# Patient Record
Sex: FEMALE | Race: WHITE | NOT HISPANIC OR LATINO | Employment: OTHER | URBAN - METROPOLITAN AREA
[De-identification: names, ages, dates, MRNs, and addresses within clinical notes are randomized per-mention and may not be internally consistent; named-entity substitution may affect disease eponyms.]

---

## 2017-11-02 ENCOUNTER — TELEPHONE (OUTPATIENT)
Dept: INTERNAL MEDICINE | Age: 37
End: 2017-11-02

## 2017-11-02 NOTE — TELEPHONE ENCOUNTER
Pt want's to know when she has a TDAP injection last.   She was walking barefoot last evening on her carpet and was stuck in the foot with a carpet tack. Foot is swollen some with bruising. No redness.

## 2017-11-29 ENCOUNTER — APPOINTMENT (OUTPATIENT)
Dept: CT IMAGING | Facility: HOSPITAL | Age: 37
End: 2017-11-29
Attending: EMERGENCY MEDICINE

## 2017-11-29 ENCOUNTER — HOSPITAL ENCOUNTER (EMERGENCY)
Facility: HOSPITAL | Age: 37
Discharge: HOME | End: 2017-11-29
Attending: EMERGENCY MEDICINE | Admitting: EMERGENCY MEDICINE

## 2017-11-29 VITALS
TEMPERATURE: 97.7 F | HEIGHT: 62 IN | RESPIRATION RATE: 18 BRPM | HEART RATE: 55 BPM | WEIGHT: 201.38 LBS | DIASTOLIC BLOOD PRESSURE: 91 MMHG | OXYGEN SATURATION: 97 % | SYSTOLIC BLOOD PRESSURE: 140 MMHG | BODY MASS INDEX: 37.06 KG/M2

## 2017-11-29 DIAGNOSIS — E27.8 ADRENAL MASS (HCC): ICD-10-CM

## 2017-11-29 DIAGNOSIS — K52.9 GASTROENTERITIS: ICD-10-CM

## 2017-11-29 DIAGNOSIS — K76.89 HEPATIC CYST: ICD-10-CM

## 2017-11-29 DIAGNOSIS — R10.31 RIGHT LOWER QUADRANT ABDOMINAL PAIN: Primary | ICD-10-CM

## 2017-11-29 LAB
ALBUMIN SERPL-MCNC: 4 G/DL (ref 3.5–5.2)
ALBUMIN/GLOB SERPL: 1.3 G/DL
ALP SERPL-CCNC: 51 U/L (ref 40–129)
ALT SERPL W P-5'-P-CCNC: 21 U/L (ref 5–33)
ANION GAP SERPL CALCULATED.3IONS-SCNC: 9.9 MMOL/L
AST SERPL-CCNC: 22 U/L (ref 5–32)
B-HCG UR QL: NEGATIVE
BASOPHILS # BLD AUTO: 0.04 10*3/MM3 (ref 0–0.2)
BASOPHILS NFR BLD AUTO: 0.7 % (ref 0–2)
BILIRUB SERPL-MCNC: 0.5 MG/DL (ref 0.2–1.2)
BILIRUB UR QL STRIP: NEGATIVE
BUN BLD-MCNC: 10 MG/DL (ref 6–20)
BUN/CREAT SERPL: 11.9 (ref 7–25)
CALCIUM SPEC-SCNC: 8.7 MG/DL (ref 8.6–10.5)
CHLORIDE SERPL-SCNC: 102 MMOL/L (ref 98–107)
CLARITY UR: CLEAR
CO2 SERPL-SCNC: 25.1 MMOL/L (ref 22–29)
COLOR UR: YELLOW
CREAT BLD-MCNC: 0.84 MG/DL (ref 0.57–1)
DEPRECATED RDW RBC AUTO: 42.4 FL (ref 37–54)
EOSINOPHIL # BLD AUTO: 0.06 10*3/MM3 (ref 0.1–0.3)
EOSINOPHIL NFR BLD AUTO: 1 % (ref 0–4)
ERYTHROCYTE [DISTWIDTH] IN BLOOD BY AUTOMATED COUNT: 13 % (ref 11.5–14.5)
GFR SERPL CREATININE-BSD FRML MDRD: 75 ML/MIN/1.73
GLOBULIN UR ELPH-MCNC: 3 GM/DL
GLUCOSE BLD-MCNC: 103 MG/DL (ref 65–99)
GLUCOSE UR STRIP-MCNC: NEGATIVE MG/DL
HCT VFR BLD AUTO: 39.3 % (ref 37–47)
HGB BLD-MCNC: 13.4 G/DL (ref 12–16)
HGB UR QL STRIP.AUTO: NEGATIVE
IMM GRANULOCYTES # BLD: 0.01 10*3/MM3 (ref 0–0.03)
IMM GRANULOCYTES NFR BLD: 0.2 % (ref 0–0.5)
KETONES UR QL STRIP: NEGATIVE
LEUKOCYTE ESTERASE UR QL STRIP.AUTO: NEGATIVE
LIPASE SERPL-CCNC: 41 U/L (ref 13–60)
LYMPHOCYTES # BLD AUTO: 1.62 10*3/MM3 (ref 0.6–4.8)
LYMPHOCYTES NFR BLD AUTO: 27.6 % (ref 20–45)
MCH RBC QN AUTO: 30.2 PG (ref 27–31)
MCHC RBC AUTO-ENTMCNC: 34.1 G/DL (ref 31–37)
MCV RBC AUTO: 88.7 FL (ref 81–99)
MONOCYTES # BLD AUTO: 0.39 10*3/MM3 (ref 0–1)
MONOCYTES NFR BLD AUTO: 6.7 % (ref 3–8)
NEUTROPHILS # BLD AUTO: 3.74 10*3/MM3 (ref 1.5–8.3)
NEUTROPHILS NFR BLD AUTO: 63.8 % (ref 45–70)
NITRITE UR QL STRIP: NEGATIVE
NRBC BLD MANUAL-RTO: 0 /100 WBC (ref 0–0)
PH UR STRIP.AUTO: 5.5 [PH] (ref 4.5–8)
PLATELET # BLD AUTO: 294 10*3/MM3 (ref 140–500)
PMV BLD AUTO: 10.4 FL (ref 7.4–10.4)
POTASSIUM BLD-SCNC: 4.6 MMOL/L (ref 3.5–5.2)
PROT SERPL-MCNC: 7 G/DL (ref 6–8.5)
PROT UR QL STRIP: NEGATIVE
RBC # BLD AUTO: 4.43 10*6/MM3 (ref 4.2–5.4)
SODIUM BLD-SCNC: 137 MMOL/L (ref 136–145)
SP GR UR STRIP: 1.01 (ref 1–1.03)
UROBILINOGEN UR QL STRIP: NORMAL
WBC NRBC COR # BLD: 5.86 10*3/MM3 (ref 4.8–10.8)

## 2017-11-29 PROCEDURE — 99283 EMERGENCY DEPT VISIT LOW MDM: CPT

## 2017-11-29 PROCEDURE — 0 IOPAMIDOL PER 1 ML: Performed by: EMERGENCY MEDICINE

## 2017-11-29 PROCEDURE — 85025 COMPLETE CBC W/AUTO DIFF WBC: CPT | Performed by: EMERGENCY MEDICINE

## 2017-11-29 PROCEDURE — 81003 URINALYSIS AUTO W/O SCOPE: CPT | Performed by: EMERGENCY MEDICINE

## 2017-11-29 PROCEDURE — 83690 ASSAY OF LIPASE: CPT | Performed by: EMERGENCY MEDICINE

## 2017-11-29 PROCEDURE — 96374 THER/PROPH/DIAG INJ IV PUSH: CPT

## 2017-11-29 PROCEDURE — 99284 EMERGENCY DEPT VISIT MOD MDM: CPT | Performed by: EMERGENCY MEDICINE

## 2017-11-29 PROCEDURE — 96361 HYDRATE IV INFUSION ADD-ON: CPT

## 2017-11-29 PROCEDURE — 81025 URINE PREGNANCY TEST: CPT | Performed by: EMERGENCY MEDICINE

## 2017-11-29 PROCEDURE — 25010000002 ONDANSETRON PER 1 MG: Performed by: EMERGENCY MEDICINE

## 2017-11-29 PROCEDURE — 80053 COMPREHEN METABOLIC PANEL: CPT | Performed by: EMERGENCY MEDICINE

## 2017-11-29 PROCEDURE — 74177 CT ABD & PELVIS W/CONTRAST: CPT

## 2017-11-29 RX ORDER — ONDANSETRON 2 MG/ML
4 INJECTION INTRAMUSCULAR; INTRAVENOUS ONCE
Status: COMPLETED | OUTPATIENT
Start: 2017-11-29 | End: 2017-11-29

## 2017-11-29 RX ORDER — ONDANSETRON HYDROCHLORIDE 8 MG/1
8 TABLET, FILM COATED ORAL EVERY 8 HOURS PRN
Qty: 10 TABLET | Refills: 0 | Status: SHIPPED | OUTPATIENT
Start: 2017-11-29 | End: 2018-08-08

## 2017-11-29 RX ORDER — SODIUM CHLORIDE 0.9 % (FLUSH) 0.9 %
10 SYRINGE (ML) INJECTION AS NEEDED
Status: DISCONTINUED | OUTPATIENT
Start: 2017-11-29 | End: 2017-11-29 | Stop reason: HOSPADM

## 2017-11-29 RX ADMIN — ONDANSETRON 4 MG: 2 INJECTION, SOLUTION INTRAMUSCULAR; INTRAVENOUS at 07:40

## 2017-11-29 RX ADMIN — SODIUM CHLORIDE 1000 ML: 9 INJECTION, SOLUTION INTRAVENOUS at 07:39

## 2017-11-29 RX ADMIN — IOPAMIDOL 100 ML: 755 INJECTION, SOLUTION INTRAVENOUS at 08:02

## 2018-07-31 DIAGNOSIS — Z00.00 PREVENTATIVE HEALTH CARE: Primary | ICD-10-CM

## 2018-08-01 LAB
ALBUMIN SERPL-MCNC: 4.6 G/DL (ref 3.5–5.2)
ALBUMIN/GLOB SERPL: 1.8 G/DL
ALP SERPL-CCNC: 42 U/L (ref 39–117)
ALT SERPL-CCNC: 18 U/L (ref 1–33)
APPEARANCE UR: CLEAR
AST SERPL-CCNC: 18 U/L (ref 1–32)
BASOPHILS # BLD AUTO: 0.02 10*3/MM3 (ref 0–0.2)
BASOPHILS NFR BLD AUTO: 0.3 % (ref 0–1.5)
BILIRUB SERPL-MCNC: 0.5 MG/DL (ref 0.1–1.2)
BILIRUB UR QL STRIP: NEGATIVE
BUN SERPL-MCNC: 17 MG/DL (ref 6–20)
BUN/CREAT SERPL: 17.2 (ref 7–25)
CALCIUM SERPL-MCNC: 9.3 MG/DL (ref 8.6–10.5)
CHLORIDE SERPL-SCNC: 100 MMOL/L (ref 98–107)
CHOLEST SERPL-MCNC: 179 MG/DL (ref 0–200)
CHOLEST/HDLC SERPL: 2.67 {RATIO}
CO2 SERPL-SCNC: 25.6 MMOL/L (ref 22–29)
COLOR UR: YELLOW
CREAT SERPL-MCNC: 0.99 MG/DL (ref 0.57–1)
EOSINOPHIL # BLD AUTO: 0.03 10*3/MM3 (ref 0–0.7)
EOSINOPHIL NFR BLD AUTO: 0.5 % (ref 0.3–6.2)
ERYTHROCYTE [DISTWIDTH] IN BLOOD BY AUTOMATED COUNT: 13.3 % (ref 11.7–13)
GLOBULIN SER CALC-MCNC: 2.5 GM/DL
GLUCOSE SERPL-MCNC: 95 MG/DL (ref 65–99)
GLUCOSE UR QL: NEGATIVE
HBA1C MFR BLD: 5.19 % (ref 4.8–5.6)
HCT VFR BLD AUTO: 43.2 % (ref 35.6–45.5)
HDLC SERPL-MCNC: 67 MG/DL (ref 40–60)
HGB BLD-MCNC: 13.8 G/DL (ref 11.9–15.5)
HGB UR QL STRIP: NEGATIVE
IMM GRANULOCYTES # BLD: 0 10*3/MM3 (ref 0–0.03)
IMM GRANULOCYTES NFR BLD: 0 % (ref 0–0.5)
KETONES UR QL STRIP: NEGATIVE
LDLC SERPL CALC-MCNC: 92 MG/DL (ref 0–100)
LEUKOCYTE ESTERASE UR QL STRIP: NEGATIVE
LYMPHOCYTES # BLD AUTO: 1.82 10*3/MM3 (ref 0.9–4.8)
LYMPHOCYTES NFR BLD AUTO: 29 % (ref 19.6–45.3)
MCH RBC QN AUTO: 29.6 PG (ref 26.9–32)
MCHC RBC AUTO-ENTMCNC: 31.9 G/DL (ref 32.4–36.3)
MCV RBC AUTO: 92.7 FL (ref 80.5–98.2)
MONOCYTES # BLD AUTO: 0.38 10*3/MM3 (ref 0.2–1.2)
MONOCYTES NFR BLD AUTO: 6.1 % (ref 5–12)
NEUTROPHILS # BLD AUTO: 4.02 10*3/MM3 (ref 1.9–8.1)
NEUTROPHILS NFR BLD AUTO: 64.1 % (ref 42.7–76)
NITRITE UR QL STRIP: NEGATIVE
PH UR STRIP: 6 [PH] (ref 5–8)
PLATELET # BLD AUTO: 295 10*3/MM3 (ref 140–500)
POTASSIUM SERPL-SCNC: 5.1 MMOL/L (ref 3.5–5.2)
PROT SERPL-MCNC: 7.1 G/DL (ref 6–8.5)
PROT UR QL STRIP: NEGATIVE
RBC # BLD AUTO: 4.66 10*6/MM3 (ref 3.9–5.2)
SODIUM SERPL-SCNC: 138 MMOL/L (ref 136–145)
SP GR UR: 1.01 (ref 1–1.03)
T4 FREE SERPL-MCNC: 1.29 NG/DL (ref 0.93–1.7)
TRIGL SERPL-MCNC: 99 MG/DL (ref 0–150)
TSH SERPL DL<=0.005 MIU/L-ACNC: 1.61 MIU/ML (ref 0.27–4.2)
UROBILINOGEN UR STRIP-MCNC: NORMAL MG/DL
VLDLC SERPL CALC-MCNC: 19.8 MG/DL (ref 5–40)
WBC # BLD AUTO: 6.27 10*3/MM3 (ref 4.5–10.7)

## 2018-08-08 ENCOUNTER — OFFICE VISIT (OUTPATIENT)
Dept: INTERNAL MEDICINE | Age: 38
End: 2018-08-08

## 2018-08-08 VITALS
WEIGHT: 182 LBS | DIASTOLIC BLOOD PRESSURE: 74 MMHG | HEIGHT: 62 IN | TEMPERATURE: 98.9 F | BODY MASS INDEX: 33.49 KG/M2 | OXYGEN SATURATION: 98 % | SYSTOLIC BLOOD PRESSURE: 128 MMHG | HEART RATE: 73 BPM

## 2018-08-08 DIAGNOSIS — Z00.00 ENCOUNTER FOR ANNUAL HEALTH EXAMINATION: Primary | ICD-10-CM

## 2018-08-08 PROBLEM — E66.9 OBESITY (BMI 30-39.9): Status: ACTIVE | Noted: 2018-08-08

## 2018-08-08 PROCEDURE — 90632 HEPA VACCINE ADULT IM: CPT | Performed by: INTERNAL MEDICINE

## 2018-08-08 PROCEDURE — 99395 PREV VISIT EST AGE 18-39: CPT | Performed by: INTERNAL MEDICINE

## 2018-08-08 PROCEDURE — 90471 IMMUNIZATION ADMIN: CPT | Performed by: INTERNAL MEDICINE

## 2018-08-08 RX ORDER — NORETHINDRONE AND ETHINYL ESTRADIOL 7 DAYS X 3
KIT ORAL
COMMUNITY
Start: 2018-07-17

## 2018-08-08 NOTE — PROGRESS NOTES
[facility] – [place] INTERNAL MEDICINE  EUNICE YUSUF M.D.      Marta ALVAREZ Pradeep / 37 y.o. / female  08/08/2018    CC:  Annual Exam      HPI:      Marta presents for annual health maintenance visit.    · Last health maintenance visit: 1 year ago  · General health: fair  · Lifestyle:  · Attempting to lose weight?: Yes   · Diet: adequate/fair  · Exercise: adequate/fair  · Tobacco: Never used   · Alcohol: occasional/rare  · Work: Full-time  · Reproductive health:  · Sexually active?: Yes   · Sexual problems?: No problems  · Concern for STD?: No    · Sees Gynecologist?: Yes   · Magui/Postmenopausal?: No   · Domestic abuse concerns: No   · Depression Screening:      PHQ-2/PHQ-9 Depression Screening 8/8/2018   Little interest or pleasure in doing things 0   Feeling down, depressed, or hopeless 0   Total Score 0         PHQ-2: 0 (Not depressed)   PHQ-9:     Patient Care Team:  Carlos Yusuf MD as PCP - General  ______________________________________________________________________    ALLERGIES  No Known Allergies     MEDICATIONS  Current Outpatient Prescriptions   Medication Sig Dispense Refill   • Calcium Carbonate-Vit D-Min (CALCIUM 1200 PO) twice daily      • cetirizine (ZyrTEC) 10 MG tablet Take 10 mg by mouth daily.     • GLUCOSAMINE SULFATE PO 3000 mg twice daily      • Omega-3 Fatty Acids (FISH OIL PO) twice daily      • omeprazole (PriLOSEC) 40 MG capsule TAKE 1 CAPSULE BY MOUTH EVERY DAY 30 capsule 2   • PIRMELLA 7/7/7 0.5/0.75/1-35 MG-MCG per tablet      • Prenatal MV-Min-Fe Fum-FA-DHA (PRENATAL 1 PO) daily     • Probiotic capsule Take  by mouth daily.       No current facility-administered medications for this visit.        PFSH:     The following portions of the patient's history were reviewed and updated as appropriate: Allergies / Current Medications / Past Medical History / Surgical History / Social History / Family History    PROBLEM LIST   Patient Active Problem List   Diagnosis   • Allergic rhinitis   • Gastroesophageal reflux  disease without esophagitis   • Chronic diarrhea of unknown origin   • Left hip pain   • Obesity (BMI 30-39.9)       PAST MEDICAL HISTORY  Past Medical History:   Diagnosis Date   • Allergic 05/2012    seasonal   • Gastroduodenal ulcer 7/29/2016    Impression: 05/29/2015 - hx of ulcers (~2011); hx of H. pylori (treated 5x); 4/2014 : recurrent ulcer (nsaid related), no H. Pylori.;    • GERD (gastroesophageal reflux disease)    • Headache 1992   • Irritable bowel syndrome 11/2013   • Multiple allergies    • Obesity (BMI 30-39.9) 8/8/2018   • PUD (peptic ulcer disease)        SURGICAL HISTORY  Past Surgical History:   Procedure Laterality Date   • LAPAROSCOPIC CHOLECYSTECTOMY         SOCIAL HISTORY  Social History     Social History   • Marital status:      Spouse name: Steve   • Number of children: 0     Occupational History   • [employer] (Manager, [employer])      Social History Main Topics   • Smoking status: Never Smoker   • Smokeless tobacco: Never Used   • Alcohol use Yes     5 Glasses of wine per week      Comment: rarely   • Drug use: No   • Sexual activity: Yes     Partners: Male     Birth control/ protection: OCP     Other Topics Concern   • Not on file       FAMILY HISTORY  Family History   Problem Relation Age of Onset   • Diabetes Mother    • Hypothyroidism Sister    • Colon cancer Paternal Grandfather         mid-80s   • Stroke Maternal Grandmother 86   • Stroke Maternal Aunt 65   • Other Father         MVA   • No Known Problems Brother    • Heart attack Maternal Grandfather 62       IMMUNIZATION HISTORY  Immunization History   Administered Date(s) Administered   • Flu Vaccine Intradermal Quad 18-64YR 09/08/2017   • Hepatitis A 08/08/2018   • Influenza, Quadrivalent 10/01/2016   • Tdap 08/21/2015       ______________________________________________________________________    REVIEW OF SYSTEMS    Review of Systems   Constitutional: Negative.         20 lbs intended weight loss   HENT:  "Negative.    Eyes: Negative.    Respiratory: Negative.    Cardiovascular: Negative.    Gastrointestinal: Negative.    Endocrine: Negative.    Genitourinary: Negative.    Musculoskeletal: Negative.    Skin: Negative.    Allergic/Immunologic: Negative.    Neurological: Negative.    Hematological: Negative.    Psychiatric/Behavioral: Negative.        VITALS:    Visit Vitals  /74 (BP Location: Left arm, Patient Position: Sitting, Cuff Size: Adult)   Pulse 73   Temp 98.9 °F (37.2 °C) (Temporal Artery )   Ht 157.5 cm (62\")   Wt 82.6 kg (182 lb)   LMP 08/06/2018 (Exact Date)   SpO2 98%   Breastfeeding? No   BMI 33.29 kg/m²       BP Readings from Last 3 Encounters:   08/08/18 128/74   11/29/17 140/91   12/19/16 118/80     Wt Readings from Last 3 Encounters:   08/08/18 82.6 kg (182 lb)   11/29/17 91.3 kg (201 lb 6 oz)   12/19/16 87.1 kg (192 lb)      Body mass index is 33.29 kg/m².    PHYSICAL EXAMINATION    Physical Exam   Constitutional: She is oriented to person, place, and time. She appears well-developed and well-nourished. No distress.   Noted weight loss   HENT:   Head: Normocephalic and atraumatic.   Right Ear: External ear normal.   Left Ear: External ear normal.   Nose: Nose normal.   Mouth/Throat: Oropharynx is clear and moist.   Eyes: Pupils are equal, round, and reactive to light. Conjunctivae and EOM are normal. No scleral icterus.   Neck: Normal range of motion. Neck supple. No tracheal deviation present. No thyroid mass and no thyromegaly present.   Cardiovascular: Normal rate, regular rhythm, normal heart sounds and intact distal pulses.    Pulmonary/Chest: Effort normal and breath sounds normal.   Abdominal: Soft. Bowel sounds are normal. She exhibits no distension and no mass. There is no tenderness. No hernia.   Genitourinary:   Genitourinary Comments: Deferred to gyne/by patient unless specified otherwise.    Musculoskeletal: She exhibits no edema or deformity.   Neurological: She is alert and " oriented to person, place, and time. She has normal reflexes. No cranial nerve deficit. She exhibits normal muscle tone. Coordination normal.   Skin: Skin is warm. No rash noted. No pallor.   Psychiatric: She has a normal mood and affect. Her behavior is normal. Judgment and thought content normal.       REVIEWED DATA    Labs:    Lab Results   Component Value Date     08/01/2018    K 5.1 08/01/2018    AST 18 08/01/2018    ALT 18 08/01/2018    BUN 17 08/01/2018    CREATININE 0.99 08/01/2018    CREATININE 0.84 11/29/2017    CREATININE 1.01 (H) 12/12/2016    EGFRIFNONA 62 08/01/2018    EGFRIFAFRI 76 08/01/2018       Lab Results   Component Value Date    GLUCOSE 103 (H) 11/29/2017    HGBA1C 5.19 08/01/2018    HGBA1C 4.80 12/12/2016    HGBA1C 5.2 08/14/2015    TSH 1.610 08/01/2018    FREET4 1.29 08/01/2018       Lab Results   Component Value Date    LDL 92 08/01/2018    HDL 67 (H) 08/01/2018    TRIG 99 08/01/2018    CHOLHDLRATIO 2.67 08/01/2018       Lab Results   Component Value Date    LBHV57LZ 59.7 12/12/2016        Lab Results   Component Value Date    WBC 5.86 11/29/2017    HGB 13.8 08/01/2018    MCV 92.7 08/01/2018     08/01/2018       Lab Results   Component Value Date    PROTEIN Negative 08/01/2018    GLUCOSEU Negative 08/01/2018    BLOODU Negative 08/01/2018    NITRITEU Negative 08/01/2018    LEUKOCYTESUR Negative 08/01/2018        No results found for: HEPCVIRUSABY    Imaging:        Medical Tests:      ______________________________________________________________________    ASSESSMENT & PLAN    ANNUAL WELLNESS EXAM / PHYSICAL     Other medical problems addressed today:  Problem List Items Addressed This Visit     None      Visit Diagnoses     Encounter for annual health examination    -  Primary    Relevant Orders    Hepatitis A Vaccine Adult IM (Completed)          Summary/Discussion:           Return for Annual physical, As Needed.    Future Appointments  Date Time Provider Department Center    2/8/2019 11:30 AM [facility] None       HEALTHCARE MAINTENANCE ISSUES:    Cancer Screening:  · Colon: Initial/Next screening at age: 43  · Repeat colonoscopy N/A at this time  · Breast: Recommended monthly self exams; annual professional exam  · Mammogram: N/A at this time  · Cervical: 2 years  · Skin: Monthly self skin examination, annual exam by health professional  · Lung:   · Other:    Screening Labs & Tests:  · Lab results reviewed & discussed with the patient or test orders placed today.  · EKG:  · Vascular Screening:   · DEXA (65+ or postmenopausal with risk factors):   · HEP C (If born 9900-9714, or risk factors): Not indicated    Immunization/Vaccinations (to be given today unless deferred by patient)  · Tetanus/Pertussis: Up to date  · Pneumovax: Not needed at this time  · Prevnar 13: Not needed at this time  · Shingles: Not needed at this time  · Influenza: Patient had the flu shot this past season  · Hepatitis A: Administer today and 2nd shot in 6 months  · Other:     Lifestyle Counseling:  · Lifestyle Modifications: Continue good lifestyle choices/modifications and Maintain a low sugar/carbohydrate diet  · Safety Issues: Always wear seatbelt, Avoid texting while driving   · Use sunscreen, regular skin examination  · Recommended annual dental/vision examination.  · Emotional/Stress/Sleep: Reviewed and  given when appropriate      Health Maintenance   Topic Date Due   • INFLUENZA VACCINE  08/01/2018   • PAP SMEAR  05/31/2019   • ANNUAL PHYSICAL  08/09/2019   • TDAP/TD VACCINES (2 - Td) 08/21/2025         **Gema Disclaimer:   Much of this encounter note is an electronic transcription/translation of spoken language to printed text. The electronic translation of spoken language may permit erroneous, or at times, nonsensical words or phrases to be inadvertently transcribed. Although I have reviewed the note for such errors, some may still exist.

## 2018-10-10 ENCOUNTER — TRANSCRIBE ORDERS (OUTPATIENT)
Dept: INTERNAL MEDICINE | Age: 38
End: 2018-10-10

## 2018-10-10 DIAGNOSIS — Z12.39 SCREENING BREAST EXAMINATION: Primary | ICD-10-CM

## 2018-10-25 ENCOUNTER — HOSPITAL ENCOUNTER (OUTPATIENT)
Dept: MAMMOGRAPHY | Facility: HOSPITAL | Age: 38
Discharge: HOME | End: 2018-10-25
Admitting: INTERNAL MEDICINE

## 2018-10-25 DIAGNOSIS — Z12.39 SCREENING BREAST EXAMINATION: ICD-10-CM

## 2018-10-25 PROCEDURE — 77063 BREAST TOMOSYNTHESIS BI: CPT

## 2018-10-25 PROCEDURE — 77067 SCR MAMMO BI INCL CAD: CPT

## 2019-02-07 ENCOUNTER — CLINICAL SUPPORT (OUTPATIENT)
Dept: INTERNAL MEDICINE | Age: 39
End: 2019-02-07

## 2019-02-07 DIAGNOSIS — Z23 NEED FOR HEPATITIS A IMMUNIZATION: Primary | ICD-10-CM

## 2019-02-07 PROCEDURE — 90632 HEPA VACCINE ADULT IM: CPT | Performed by: INTERNAL MEDICINE

## 2019-02-07 PROCEDURE — 90471 IMMUNIZATION ADMIN: CPT | Performed by: INTERNAL MEDICINE

## 2023-03-29 ENCOUNTER — OFFICE VISIT (OUTPATIENT)
Dept: ORTHOPEDIC SURGERY | Facility: CLINIC | Age: 43
End: 2023-03-29
Payer: COMMERCIAL

## 2023-03-29 VITALS — BODY MASS INDEX: 31.34 KG/M2 | HEIGHT: 62 IN | TEMPERATURE: 97.5 F | WEIGHT: 170.3 LBS

## 2023-03-29 DIAGNOSIS — M25.562 LEFT KNEE PAIN, UNSPECIFIED CHRONICITY: Primary | ICD-10-CM

## 2023-03-29 RX ORDER — VITAMIN B COMPLEX
CAPSULE ORAL DAILY
COMMUNITY

## 2023-03-29 RX ORDER — ASCORBIC ACID 1000 MG
TABLET ORAL
COMMUNITY

## 2023-03-29 NOTE — PROGRESS NOTES
"New Knee Notes: Patient: Marta Fernández    YOB: 1980    Medical Record Number: [id number]    Chief Complaints:  Left knee pain    History of Present Illness:     42 y.o. female patient who presents for evaluation of the left knee.  The symptoms began in 2020.  She tells me her gym closed during the pandemic and so she took up running.  She recalls an incident where she \"tweaked\" the knee.  She says it was sore for a number of weeks afterwards.  She has a friend who works as a physical therapist.  The friend recommended some exercises for her.  The exercises helped but the issue persisted albeit intermittently.  In May 2022 she was training for an Ironman and she says that she did a particularly long run.  She recalls that the day after this run she could hardly walk.  Unfortunately the knee has continued to bother her.  Current pain is described as moderate, constant and aching. She localizes the pain to the lateral and anterolateral aspect of the knee.  She reports associated clicking and popping.  No catching or locking.  Denies having noticed any alleviating factors.  Denies other injuries or complaints.      Allergies: No Known Allergies    Home Medications    Current Outpatient Medications:   •  ASHWAGANDHA PO, Take  by mouth., Disp: , Rfl:   •  B Complex Vitamins (vitamin b complex) capsule capsule, Take  by mouth Daily., Disp: , Rfl:   •  Calcium Carbonate-Vit D-Min (CALCIUM 1200 PO), twice daily , Disp: , Rfl:   •  cetirizine (ZyrTEC) 10 MG tablet, Take 1 tablet by mouth Daily., Disp: , Rfl:   •  Coenzyme Q10 (Co Q 10) 10 MG capsule, Take  by mouth., Disp: , Rfl:   •  COLLAGEN PO, Take  by mouth., Disp: , Rfl:   •  Ferrous Sulfate (IRON PO), Take  by mouth., Disp: , Rfl:   •  MAGNESIUM PO, Take  by mouth., Disp: , Rfl:   •  Multiple Vitamins-Minerals (ZINC PO), Take  by mouth., Disp: , Rfl:   •  Omega-3 Fatty Acids (FISH OIL PO), twice daily , Disp: , Rfl:   •  omeprazole (PriLOSEC) 40 MG " capsule, TAKE 1 CAPSULE BY MOUTH EVERY DAY, Disp: 30 capsule, Rfl: 2  •  Prenatal MV-Min-Fe Fum-FA-DHA (PRENATAL 1 PO), daily, Disp: , Rfl:   •  Probiotic capsule, Take  by mouth daily., Disp: , Rfl:   •  TURMERIC PO, Take  by mouth., Disp: , Rfl:   •  GLUCOSAMINE SULFATE PO, 3000 mg twice daily  (Patient not taking: Reported on 3/29/2023), Disp: , Rfl:   •  PIRMELLA 7/7/7 0.5/0.75/1-35 MG-MCG per tablet, , Disp: , Rfl:     Past Medical History:   Diagnosis Date   • Allergic 05/2012    seasonal   • Gastroduodenal ulcer 7/29/2016    Impression: 05/29/2015 - hx of ulcers (~2011); hx of H. pylori (treated 5x); 4/2014 : recurrent ulcer (nsaid related), no H. 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, [employer])   Tobacco Use   • Smoking status: Never   • Smokeless tobacco: Never   Substance and Sexual Activity   • Alcohol use: Yes     Types: 5 Glasses of wine per week     Comment: rarely   • Drug use: No   • Sexual activity: Yes     Partners: Male     Birth control/protection: OCP      Social History     Social History Narrative   • Not on file       Family History   Problem Relation Age of Onset   • Diabetes Mother    • Hypothyroidism Sister    • Colon cancer Paternal Grandfather         mid-80s   • Stroke Maternal Grandmother 86   • Stroke Maternal Aunt 65   • Other Father         MVA   • No Known Problems Brother    • Heart attack Maternal Grandfather 62   • Breast cancer Neg Hx        Review of Systems:      Constitutional: Denies fever, shaking or chills   Eyes: Denies change in visual acuity   HEENT: Denies nasal congestion or sore throat   Respiratory: Denies cough or  "shortness of breath   Cardiovascular: Denies chest pain or edema  Endocrine: Denies tremors, palpitations, intolerance of heat or cold, polyuria, polydipsia.  GI: Denies abdominal pain, nausea, vomiting, bloody stools or diarrhea  : Denies frequency, urgency, incontinence, retention, or nocturia.  Musculoskeletal: Denies numbness, tingling or loss of motor function except as above  Integument: Denies rash, lesion or ulceration   Neurologic: Denies headache or focal weakness, deficits  Heme: Denies spontaneous or excessive bleeding, epistaxis, hematuria, melena, fatigue, enlarged or tender lymph nodes.      All other pertinent positives and negatives as noted above in HPI.    Physical Exam:   42 y.o. female  Vitals:    03/29/23 0937   Temp: 97.5 °F (36.4 °C)   Weight: 77.2 kg (170 lb 4.8 oz)   Height: 157.5 cm (62\")     General:  Patient is awake and alert.  Appears in no acute distress or discomfort.    Psych:  Affect and demeanor are appropriate.    Eyes:  Conjunctiva and sclera appear grossly normal.  Eyes track well and EOM seem to be intact.    Ears:  No gross abnormalities.  Hearing adequate for the exam.    Cardiovascular:  Regular rate and rhythm.    Lungs:  Good chest expansion.  Breathing unlabored.    Spine:  Back appears grossly normal.  No palpable masses or adenopathy.  Good motion.  Straight leg raise and crossed straight leg raise maneuver are both negative for lower leg and/or knee pain.    Extremities:  Left knee is examined.  Skin is benign.  No obvious gross abnormalities.  No palpable masses or adenopathy.  No tenderness over the IT band or lateral epicondyle.  Mild tenderness noted over lateral joint line.  Motion is full and symmetric to the contralateral side.  No significant pain with hyperflexion.  Lateral Jovan's test is a little uncomfortable but not overtly positive.  No instability.  Strength is well preserved including hip flexion, knee extension, ankle and toe plantarflexion, " ankle inversion and eversion.  Good sensory function throughout the leg and foot.  Palpable pulses.  Brisk capillary refill.  Good skin turgor.         Radiology:   Bilateral standing AP views, bilateral merchants views and a lateral view of the left knee are ordered by myself and reviewed to evaluate the patient's complaint.  No comparison films are immediately available.  The x-rays show no acute abnormalities, lesions, masses, significant degenerative changes, malalignment or other concerning findings.    Assessment/Plan:  Left knee pain, possible meniscal tear    Given her history I was suspicious that this may be IT band syndrome.  Her exam does not seem to support that.  It could be that she is got some lateral compartment chondromalacia or may be a stable lateral meniscal tear.  Hard to know for sure at this point.  We talked about her options.  We could try treating this conservatively with injections and/or PT.  She feels like she has done therapy and it really did not help all that much.  She is frustrated by her persistent symptoms and wants to pursue further work-up.  I think that is reasonable at this point.  I am going to send her for an MRI.  I told her I will call her when I see the results.  We will come up with a plan at that point.    Savage Long MD    03/29/2023    CC to Margaret Gray APRN

## 2023-03-30 ENCOUNTER — PATIENT ROUNDING (BHMG ONLY) (OUTPATIENT)
Dept: ORTHOPEDIC SURGERY | Facility: CLINIC | Age: 43
End: 2023-03-30
Payer: COMMERCIAL

## 2023-03-30 NOTE — PROGRESS NOTES
A Manflu Message has been sent to the patient for PATIENT ROUNDING with [facility] [place]

## 2023-04-18 ENCOUNTER — HOSPITAL ENCOUNTER (OUTPATIENT)
Dept: MRI IMAGING | Facility: HOSPITAL | Age: 43
Discharge: HOME | End: 2023-04-18
Admitting: ORTHOPAEDIC SURGERY
Payer: COMMERCIAL

## 2023-04-18 DIAGNOSIS — M25.562 LEFT KNEE PAIN, UNSPECIFIED CHRONICITY: ICD-10-CM

## 2023-04-18 PROCEDURE — 73721 MRI JNT OF LWR EXTRE W/O DYE: CPT

## 2023-04-21 ENCOUNTER — TELEPHONE (OUTPATIENT)
Dept: ORTHOPEDIC SURGERY | Facility: HOSPITAL | Age: 43
End: 2023-04-21
Payer: COMMERCIAL

## 2023-04-21 NOTE — TELEPHONE ENCOUNTER
I spoke with her and gave her the results.  I told her that I do not see anything that would warrant any surgery at this point.  I suggested that activity modifications, anti inflammatories, PT and possible an injection should be beneficial.  She is going to try rest and home PT.  She says she will call me if she is not seeing improvement.

## 2025-02-28 ENCOUNTER — TELEPHONE (OUTPATIENT)
Dept: OBSTETRICS AND GYNECOLOGY | Age: 45
End: 2025-02-28

## 2025-02-28 NOTE — TELEPHONE ENCOUNTER
"    Caller: Marta Fernández \"Neisha\"    Relationship to patient: Self    Best call back number: [phone]     Chief complaint: ADD MAMMOGRAM TO NEW GYN    Type of visit: MAMMOGRAM    Requested date: NEW GYN IS SCHEDULED FOR 07/14/25 AT 1:30 PM WITH .    If rescheduling, when is the original appointment: NA    Additional notes: LAST MAMMOGRAM PERFORMED DECEMBER 2023 WITH [facility].           "

## 2025-03-03 DIAGNOSIS — Z12.31 SCREENING MAMMOGRAM FOR BREAST CANCER: Primary | ICD-10-CM

## 2025-07-14 ENCOUNTER — OFFICE VISIT (OUTPATIENT)
Dept: OBSTETRICS AND GYNECOLOGY | Age: 45
End: 2025-07-14
Payer: COMMERCIAL

## 2025-07-14 VITALS
SYSTOLIC BLOOD PRESSURE: 122 MMHG | WEIGHT: 201.6 LBS | DIASTOLIC BLOOD PRESSURE: 82 MMHG | BODY MASS INDEX: 37.1 KG/M2 | HEIGHT: 62 IN

## 2025-07-14 DIAGNOSIS — Z12.31 ENCOUNTER FOR SCREENING MAMMOGRAM FOR MALIGNANT NEOPLASM OF BREAST: Primary | ICD-10-CM

## 2025-07-14 DIAGNOSIS — Z01.419 ENCOUNTER FOR GYNECOLOGICAL EXAMINATION WITHOUT ABNORMAL FINDING: ICD-10-CM

## 2025-07-14 PROCEDURE — 99213 OFFICE O/P EST LOW 20 MIN: CPT | Performed by: OBSTETRICS & GYNECOLOGY

## 2025-07-14 PROCEDURE — 99386 PREV VISIT NEW AGE 40-64: CPT | Performed by: OBSTETRICS & GYNECOLOGY

## 2025-07-14 RX ORDER — VALACYCLOVIR HYDROCHLORIDE 1 G/1
1000 TABLET, FILM COATED ORAL 2 TIMES DAILY PRN
Qty: 30 TABLET | Refills: 2 | Status: SHIPPED | OUTPATIENT
Start: 2025-07-14 | End: 2025-07-19

## 2025-07-14 RX ORDER — ESTRADIOL 0.07 MG/D
1 FILM, EXTENDED RELEASE TRANSDERMAL 2 TIMES WEEKLY
Qty: 8 PATCH | Refills: 11 | Status: SHIPPED | OUTPATIENT
Start: 2025-07-14

## 2025-07-14 NOTE — PROGRESS NOTES
Routine Annual Visit    2025    Patient: Marta Fernández          MR#:[id number]      Chief Complaint   Patient presents with    Gynecologic Exam     New pt, last AE with [facility] 2024, MG 2023, cologuard 10/2/2024.  Pt c/o irregular menses with ocp. Pt having hot flashes, mood swings.        History of Present Illness    44 y.o. female  who presents for annual exam.     Has gained about 30 lb the past year or so, has tried lots of things to help like keto, intermittent fasting  She has brain fog, fatigue  She has hot flashes more often if she has dairy but usually 4-5 times a week.    She has been on slynd the past year, sometimes no menses but then she will have prolonged menses up to 14 days with lighter bleeding. Starts in the middle of the pack at times.  Had been on cyclic progesterone last year    Two years ago estrogen high and progesterone low on blood work. Wanted some labs.     She had a mirena placed  and she had persistently bleeding two months after it was placed and had it removed.    Dx with genital herpes the past year, found out about infidelity    Patient's last menstrual period was 2025 (exact date).  Obstetric History:  OB History          0    Para   0    Term   0       0    AB   0    Living   0         SAB   0    IAB   0    Ectopic   0    Molar        Multiple   0    Live Births                   Menstrual History:     Patient's last menstrual period was 2025 (exact date).       ________________________________________  Patient Active Problem List   Diagnosis    Allergic rhinitis    Gastroesophageal reflux disease without esophagitis    Chronic diarrhea of unknown origin    Left hip pain    Obesity (BMI 30-39.9)       Past Medical History:   Diagnosis Date    Allergic 2012    seasonal    Gastroduodenal ulcer 2016    Impression: 2015 - hx of ulcers (~); hx of H. pylori (treated 5x); 2014 : recurrent ulcer (nsaid related), no H.  "Pylori.;     GERD (gastroesophageal reflux disease)     Headache 1992    Irritable bowel syndrome 11/2013    Multiple allergies     Obesity (BMI 30-39.9) 8/8/2018    PUD (peptic ulcer disease)        Family History   Problem Relation Age of Onset    Diabetes Mother     Hypothyroidism Sister     Colon cancer Paternal Grandfather         mid-80s    Stroke Maternal Grandmother 86    Stroke Maternal Aunt 65    Other Father         MVA    No Known Problems Brother     Heart attack Maternal Grandfather 62    Breast cancer Neg Hx        Past Surgical History:   Procedure Laterality Date    LAPAROSCOPIC CHOLECYSTECTOMY         Social History     Tobacco Use   Smoking Status Never   Smokeless Tobacco Never       has a current medication list which includes the following prescription(s): vitamin b complex, calcium carbonate-vit d-min, cetirizine, co q 10, collagen-vitamin c-biotin, drospirenone, ferrous sulfate, magnesium, omega-3 fatty acids, omeprazole, probiotic, ashwagandha, glucosamine sulfate, multiple vitamins-minerals, pirmella 7/7/7, prenatal mv-min-fe fum-fa-dha, and turmeric.  ________________________________________        Objective   Physical Exam    /82   Ht 157.5 cm (62\")   Wt 91.4 kg (201 lb 9.6 oz)   LMP 06/01/2025 (Exact Date)   BMI 36.87 kg/m²    BP Readings from Last 3 Encounters:   07/14/25 122/82   08/08/18 128/74   11/29/17 140/91      Wt Readings from Last 3 Encounters:   07/14/25 91.4 kg (201 lb 9.6 oz)   04/18/23 76.7 kg (169 lb)   03/29/23 77.2 kg (170 lb 4.8 oz)         BMI: Body mass index is 36.87 kg/m².       General:   alert, appears stated age, and cooperative   Neck: No thyromegaly or LAD   Abdomen: soft, non-tender, without masses or organomegaly   Breast: inspection negative, no nipple discharge or bleeding, no masses or nodularity palpable   Urethra and bladder: urethral meatus normal; bladder nontender to palpation;   Vulva: normal, Bartholin's, Urethra, [place]'s normal   Vagina: " normal mucosa, normal discharge   Cervix: no lesions and nulliparous appearance   Uterus: normal size, non-tender, and anteverted   Adnexa: normal adnexa and no mass, fullness, tenderness       Assessment:    normal annual exam   Perimenopause  AUB  Hx HSV    Plan:    Plan     [x]  Mammogram request made  [x]  PAP done  []  Labs:   []  GC/Chl/TV  []  DEXA scan   []  Referral for colonoscopy:     Discussed tx options of perimenopausal sx and will start some HRT in addition to the slynd she is on for cycle control  However the slynd is not really controlling her cycle and recommend US for further eval as she had AUB with mirena also.  PRN valtrex sent in    Counseling  [x] Menopause  [x]  Nutrition  [x]  Physical activity/regular exercise   [x]  Healthy weight  []  Injury prevention  []  Smoking cessation  []  Substance misuse/abuse  [x]  Sexual behavior  []  STD prevention  []  Contraception  []  Dental health  []  Mental health  []  Immunization  [x]  Encouraged SBE        Ekta Tanner MD  07/14/2025  13:37 EDT

## 2025-07-16 LAB
CYTOLOGIST CVX/VAG CYTO: NORMAL
CYTOLOGY CVX/VAG DOC CYTO: NORMAL
CYTOLOGY CVX/VAG DOC THIN PREP: NORMAL
DX ICD CODE: NORMAL
HPV I/H RISK 4 DNA CVX QL PROBE+SIG AMP: NEGATIVE
OTHER STN SPEC: NORMAL
SERVICE CMNT-IMP: NORMAL
STAT OF ADQ CVX/VAG CYTO-IMP: NORMAL